# Patient Record
Sex: FEMALE | HISPANIC OR LATINO | Employment: STUDENT | URBAN - METROPOLITAN AREA
[De-identification: names, ages, dates, MRNs, and addresses within clinical notes are randomized per-mention and may not be internally consistent; named-entity substitution may affect disease eponyms.]

---
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
## 2024-06-01 ENCOUNTER — OFFICE VISIT (OUTPATIENT)
Dept: URGENT CARE | Facility: PHYSICIAN GROUP | Age: 19
End: 2024-06-01

## 2024-06-01 VITALS
DIASTOLIC BLOOD PRESSURE: 88 MMHG | HEIGHT: 62 IN | RESPIRATION RATE: 20 BRPM | HEART RATE: 80 BPM | OXYGEN SATURATION: 98 % | BODY MASS INDEX: 28.37 KG/M2 | WEIGHT: 154.2 LBS | TEMPERATURE: 98.1 F | SYSTOLIC BLOOD PRESSURE: 116 MMHG

## 2024-06-01 DIAGNOSIS — K04.7 DENTAL INFECTION: ICD-10-CM

## 2024-06-01 PROCEDURE — 1125F AMNT PAIN NOTED PAIN PRSNT: CPT | Performed by: NURSE PRACTITIONER

## 2024-06-01 PROCEDURE — 3074F SYST BP LT 130 MM HG: CPT | Performed by: NURSE PRACTITIONER

## 2024-06-01 PROCEDURE — 99203 OFFICE O/P NEW LOW 30 MIN: CPT | Performed by: NURSE PRACTITIONER

## 2024-06-01 PROCEDURE — 3079F DIAST BP 80-89 MM HG: CPT | Performed by: NURSE PRACTITIONER

## 2024-06-01 RX ORDER — AMOXICILLIN 500 MG/1
500 CAPSULE ORAL 2 TIMES DAILY
Qty: 14 CAPSULE | Refills: 0 | Status: SHIPPED | OUTPATIENT
Start: 2024-06-01 | End: 2024-06-08

## 2024-06-01 RX ORDER — IBUPROFEN 200 MG
200 TABLET ORAL EVERY 6 HOURS PRN
COMMUNITY

## 2024-06-01 ASSESSMENT — PAIN SCALES - GENERAL: PAINLEVEL: 10=SEVERE PAIN

## 2024-06-01 NOTE — PROGRESS NOTES
"Subjective:   Roxann Kirk is a 18 y.o. female who presents for Dental Pain (Per pt, started 3-4 days ago)    Patient is a 18 year old female presented clinic today reporting 3 to 4-day history of left upper incisor pain and pain in her gums.  She states the pain is worse when chewing however this is constant throughout the day.  She states she does have radiating pain into the top of her jaw.  She has mild gingival swelling.  She has not had any fevers, chills, or sensitivity to heat or cold.  Has been using over-the-counter acetaminophen and ibuprofen with some symptom improvement.  Patient denies any recent injury or trauma to the tooth.      Medications, Allergies, and current problem list reviewed today in Epic.     Objective:     /88 (BP Location: Left arm, Patient Position: Sitting, BP Cuff Size: Adult)   Pulse 80   Temp 36.7 °C (98.1 °F) (Temporal)   Resp 20   Ht 1.575 m (5' 2\")   Wt 69.9 kg (154 lb 3.2 oz)   SpO2 98%     Physical Exam  Vitals reviewed.   Constitutional:       Appearance: Normal appearance.   HENT:      Head: Normocephalic.      Nose: Nose normal.      Mouth/Throat:      Lips: Pink. No lesions.      Mouth: Mucous membranes are moist.      Dentition: Abnormal dentition. Dental tenderness and gingival swelling present. No dental abscesses.     Eyes:      Extraocular Movements: Extraocular movements intact.      Conjunctiva/sclera: Conjunctivae normal.      Pupils: Pupils are equal, round, and reactive to light.   Cardiovascular:      Rate and Rhythm: Normal rate.   Pulmonary:      Effort: Pulmonary effort is normal.   Musculoskeletal:         General: Normal range of motion.      Cervical back: Normal range of motion and neck supple.   Skin:     General: Skin is warm and dry.   Neurological:      Mental Status: She is alert and oriented to person, place, and time.   Psychiatric:         Mood and Affect: Mood normal.         Behavior: Behavior normal.         Thought Content: " Thought content normal.         Judgment: Judgment normal.         Assessment/Plan:     Diagnosis and associated orders:     1. Dental infection  amoxicillin (AMOXIL) 500 MG Cap         Comments/MDM:     This acute condition.  Patient presents clinic today with mild gingival swelling and dental tenderness to left upper incisor.  No signs of abscess formation at this time.  Patient was prescribed amoxicillin, side effects medication discussed.  OTC Tylenol or Motrin for fever/discomfort.  Ice  Drink plenty of fluids  Follow-up with dentist as soon as possible  Return to clinic or go to the ED if symptoms worsen or fail to improve, or if the patient should develop worsening/increasing dental pain, facial swelling, redness or warmth to the affected area, difficulty swallowing, shortness of breath, fever/chills, and/or any concerning symptoms.  Patient was involved with shared decision-making throughout the exam today and verbalizes understanding regards to plan of care, discharge instructions, and follow-up         Differential diagnosis, natural history, supportive care, and indications for immediate follow-up discussed.    Advised the patient to follow-up with the primary care physician for recheck, reevaluation, and consideration of further management.    I personally reviewed prior external notes and test results pertinent to today's visit as well as additional imaging and testing completed in clinic today.     Please note that this dictation was created using voice recognition software. I have made a reasonable attempt to correct obvious errors, but I expect that there are errors of grammar and possibly content that I did not discover before finalizing the note.

## 2024-09-20 NOTE — PROGRESS NOTES
Refill:  esogestrel-ethinyl estradiol (Volnea) 0.15-0.02/0.01 MG (21/5) per tablet   Last Refill:  11/10/23 #84 w/4 Refills  Last Visit:  11/10/23 w/Dr. Cantu  Patient needs to be seen for a yearly visit to get refill.    Roxann Kirk is a 15 y.o.  who returns to clinic today for her 6 week post partum appointment after  performed at 39w6d on 12/10/20.  She reports that since being seen last she has been doing well.  She is bonding well with her infant and she is bottelfeeding.  She has started OCPs and is on the 2nd week.  Just started having some break through bleeding a couple days ago.  Had stopped bleeding for past 2 weeks.  She is not longer taking any pain medications.  She is participating in her usual activities but has followed the lifting precautions and has not yet resumed intercourse.  Denies breast complaints, abnormal vaginal discharge, urinary symptoms, bowel complaints, or other concerns at this time.    All other systems are reviewed and are negative.    PMH, PSH, SH, and FH reviewed with patient today - changes made in chart.    /78   Wt 70.8 kg (156 lb)   LMP 2021   Breastfeeding No   Breasts: no masses, engorgement, pain or erythema  CVS: RRR  Resp: CTA bilaterally  Abdomen: Abdomen soft, non-tender.  No masses,  No organomegaly  Extremities: no cyanosis, clubbing, no edema    No current facility-administered medications for this visit.        Lab: No results found for this or any previous visit (from the past 48 hour(s)).    [address]    Roxann Kirk is a 15 y.o.  returns to clinic today for her 6 week post partum/post op appointmet after .  #post partum/post op.  Meeting all milestones.  Discussed she may resume intercourse and no longer has any lifting precautions  #STD prevention.  Education provided.  Not currently sexually active but discussed condoms and safe sex.  #Post partum depression score. 6  #Post partum birth control method: She was counselled about the various options including OCPs, patch, ring, IUDs, and implant.  Is using OCPs and will let me know if she desires another method  #Recommend follow up in a year for annual or sooner if issues or  concerns arise.  DAVIDB